# Patient Record
Sex: MALE | Race: BLACK OR AFRICAN AMERICAN | NOT HISPANIC OR LATINO | URBAN - METROPOLITAN AREA
[De-identification: names, ages, dates, MRNs, and addresses within clinical notes are randomized per-mention and may not be internally consistent; named-entity substitution may affect disease eponyms.]

---

## 2017-01-23 ENCOUNTER — INPATIENT (INPATIENT)
Facility: HOSPITAL | Age: 32
LOS: 0 days | Discharge: HOME | End: 2017-01-24
Attending: INTERNAL MEDICINE | Admitting: INTERNAL MEDICINE
Payer: COMMERCIAL

## 2017-01-23 VITALS
TEMPERATURE: 99 F | HEIGHT: 71 IN | WEIGHT: 235.01 LBS | OXYGEN SATURATION: 97 % | HEART RATE: 83 BPM | RESPIRATION RATE: 17 BRPM | DIASTOLIC BLOOD PRESSURE: 96 MMHG | SYSTOLIC BLOOD PRESSURE: 131 MMHG

## 2017-01-23 LAB
ALBUMIN SERPL ELPH-MCNC: 3.7 G/DL — SIGNIFICANT CHANGE UP (ref 3.3–5)
ALP SERPL-CCNC: 69 U/L — SIGNIFICANT CHANGE UP (ref 40–120)
ALT FLD-CCNC: 28 U/L — SIGNIFICANT CHANGE UP (ref 12–78)
ANION GAP SERPL CALC-SCNC: 9 MMOL/L — SIGNIFICANT CHANGE UP (ref 5–17)
APTT BLD: 29.9 SEC — SIGNIFICANT CHANGE UP (ref 27.5–37.4)
AST SERPL-CCNC: 23 U/L — SIGNIFICANT CHANGE UP (ref 15–37)
BASOPHILS # BLD AUTO: 0.1 K/UL — SIGNIFICANT CHANGE UP (ref 0–0.2)
BASOPHILS NFR BLD AUTO: 1.2 % — SIGNIFICANT CHANGE UP (ref 0–2)
BILIRUB SERPL-MCNC: 0.2 MG/DL — SIGNIFICANT CHANGE UP (ref 0.2–1.2)
BUN SERPL-MCNC: 20 MG/DL — SIGNIFICANT CHANGE UP (ref 7–23)
CALCIUM SERPL-MCNC: 8.7 MG/DL — SIGNIFICANT CHANGE UP (ref 8.5–10.1)
CHLORIDE SERPL-SCNC: 107 MMOL/L — SIGNIFICANT CHANGE UP (ref 96–108)
CO2 SERPL-SCNC: 28 MMOL/L — SIGNIFICANT CHANGE UP (ref 22–31)
CREAT SERPL-MCNC: 1.14 MG/DL — SIGNIFICANT CHANGE UP (ref 0.5–1.3)
EOSINOPHIL # BLD AUTO: 0.2 K/UL — SIGNIFICANT CHANGE UP (ref 0–0.5)
EOSINOPHIL NFR BLD AUTO: 2.2 % — SIGNIFICANT CHANGE UP (ref 0–6)
ERYTHROCYTE [SEDIMENTATION RATE] IN BLOOD: 25 MM/HR — HIGH (ref 0–15)
GLUCOSE SERPL-MCNC: 94 MG/DL — SIGNIFICANT CHANGE UP (ref 70–99)
HCT VFR BLD CALC: 39.2 % — SIGNIFICANT CHANGE UP (ref 39–50)
HGB BLD-MCNC: 14.1 G/DL — SIGNIFICANT CHANGE UP (ref 13–17)
INR BLD: 1.1 RATIO — SIGNIFICANT CHANGE UP (ref 0.88–1.16)
LACTATE SERPL-SCNC: 0.7 MMOL/L — SIGNIFICANT CHANGE UP (ref 0.7–2)
LYMPHOCYTES # BLD AUTO: 2.8 K/UL — SIGNIFICANT CHANGE UP (ref 1–3.3)
LYMPHOCYTES # BLD AUTO: 33 % — SIGNIFICANT CHANGE UP (ref 13–44)
MCHC RBC-ENTMCNC: 32.9 PG — SIGNIFICANT CHANGE UP (ref 27–34)
MCHC RBC-ENTMCNC: 36 GM/DL — SIGNIFICANT CHANGE UP (ref 32–36)
MCV RBC AUTO: 91.6 FL — SIGNIFICANT CHANGE UP (ref 80–100)
MONOCYTES # BLD AUTO: 0.8 K/UL — SIGNIFICANT CHANGE UP (ref 0–0.9)
MONOCYTES NFR BLD AUTO: 9.2 % — SIGNIFICANT CHANGE UP (ref 2–14)
NEUTROPHILS # BLD AUTO: 4.5 K/UL — SIGNIFICANT CHANGE UP (ref 1.8–7.4)
NEUTROPHILS NFR BLD AUTO: 54.4 % — SIGNIFICANT CHANGE UP (ref 43–77)
PLATELET # BLD AUTO: 258 K/UL — SIGNIFICANT CHANGE UP (ref 150–400)
POTASSIUM SERPL-MCNC: 4 MMOL/L — SIGNIFICANT CHANGE UP (ref 3.5–5.3)
POTASSIUM SERPL-SCNC: 4 MMOL/L — SIGNIFICANT CHANGE UP (ref 3.5–5.3)
PROT SERPL-MCNC: 7.7 GM/DL — SIGNIFICANT CHANGE UP (ref 6–8.3)
PROTHROM AB SERPL-ACNC: 12.3 SEC — SIGNIFICANT CHANGE UP (ref 10–13.1)
RBC # BLD: 4.28 M/UL — SIGNIFICANT CHANGE UP (ref 4.2–5.8)
RBC # FLD: 11.1 % — SIGNIFICANT CHANGE UP (ref 11–15)
SODIUM SERPL-SCNC: 144 MMOL/L — SIGNIFICANT CHANGE UP (ref 135–145)
WBC # BLD: 8.4 K/UL — SIGNIFICANT CHANGE UP (ref 3.8–10.5)
WBC # FLD AUTO: 8.4 K/UL — SIGNIFICANT CHANGE UP (ref 3.8–10.5)

## 2017-01-23 PROCEDURE — 73130 X-RAY EXAM OF HAND: CPT | Mod: 26,RT

## 2017-01-23 PROCEDURE — 99285 EMERGENCY DEPT VISIT HI MDM: CPT

## 2017-01-23 RX ORDER — PIPERACILLIN AND TAZOBACTAM 4; .5 G/20ML; G/20ML
3.38 INJECTION, POWDER, LYOPHILIZED, FOR SOLUTION INTRAVENOUS ONCE
Qty: 0 | Refills: 0 | Status: COMPLETED | OUTPATIENT
Start: 2017-01-23 | End: 2017-01-23

## 2017-01-23 RX ORDER — SODIUM CHLORIDE 9 MG/ML
1000 INJECTION INTRAMUSCULAR; INTRAVENOUS; SUBCUTANEOUS ONCE
Qty: 0 | Refills: 0 | Status: COMPLETED | OUTPATIENT
Start: 2017-01-23 | End: 2017-01-23

## 2017-01-23 RX ORDER — VANCOMYCIN HCL 1 G
1000 VIAL (EA) INTRAVENOUS ONCE
Qty: 0 | Refills: 0 | Status: COMPLETED | OUTPATIENT
Start: 2017-01-23 | End: 2017-01-23

## 2017-01-23 RX ORDER — KETOROLAC TROMETHAMINE 30 MG/ML
30 SYRINGE (ML) INJECTION ONCE
Qty: 0 | Refills: 0 | Status: DISCONTINUED | OUTPATIENT
Start: 2017-01-23 | End: 2017-01-23

## 2017-01-23 RX ADMIN — PIPERACILLIN AND TAZOBACTAM 200 GRAM(S): 4; .5 INJECTION, POWDER, LYOPHILIZED, FOR SOLUTION INTRAVENOUS at 20:21

## 2017-01-23 RX ADMIN — Medication 30 MILLIGRAM(S): at 20:21

## 2017-01-23 RX ADMIN — Medication 250 MILLIGRAM(S): at 22:02

## 2017-01-23 RX ADMIN — SODIUM CHLORIDE 1000 MILLILITER(S): 9 INJECTION INTRAMUSCULAR; INTRAVENOUS; SUBCUTANEOUS at 20:21

## 2017-01-23 NOTE — ED PROVIDER NOTE - CONSTITUTIONAL, MLM
normal... Well appearing, well nourished, awake, alert, oriented to person, place, time/situation and in no apparent distress. Speaking in clear full sentences no nasal flaring no shoulders retractions appears very comfortable sitting up at the edge of the stretcher

## 2017-01-23 NOTE — ED PROVIDER NOTE - OBJECTIVE STATEMENT
31 years old male walked in c/o swelling pain 7/10 to right 4th digit for one week. Pt sts he initially had a splinter and he removed it with a needle himself then it became swelling. Pt then went to [facility] treated by trying the drain by needle but not thing came out. and started on keflex orally 4 times a day but the swelling and pain increases over the weekend and swelling spread to his entire right hand. Pt denies headache, dizziness, focal/distal weakness or numbness, cough, sob, chest pain, nausea, vomiting, fever, chills, abd pain, pus drainage, or bleeding.

## 2017-01-23 NOTE — ED ADULT TRIAGE NOTE - CHIEF COMPLAINT QUOTE
" I have a splinter in my right ring finger and I opened it up and it swelled up, I went to [facility] they gave me a shot and antibiotics but it has been getting worse and now my whole right hand is swollen"

## 2017-01-23 NOTE — ED PROVIDER NOTE - PROGRESS NOTE DETAILS
Pt endorsed by Dr. Treadwell, present for eval of swelling & erythema of finger after removing splinter.  Pt was on Keflex, failed outpt tx of abx.  Pending remaining labs, imaging & plan to admit. Pt VSS in NAD, receiving Vancomycin.  Pt still deciding if he is agreeable to admission. pt agreeable to admission, previously discussed w Dr. Petty.  pt admitted for further eval/mgmt.

## 2017-01-23 NOTE — ED ADULT NURSE NOTE - CHIEF COMPLAINT QUOTE
" I have a splinter in my right ring finger and I opened it up and it swelled up, I went to [place] they gave me a shot and antibiotics but it has been getting worse and now my whole right hand is swollen"

## 2017-01-23 NOTE — ED PROVIDER NOTE - PHYSICAL EXAMINATION
+ swelling of 4th digit of right hand involving interphalangeal joints + swelling of right hand compare to left hand, good <2 seconds cap refills. able to flex and extend all joints of right 4th digits actively. tender to palp the right 4th digits but not right hand

## 2017-01-24 VITALS
HEART RATE: 84 BPM | DIASTOLIC BLOOD PRESSURE: 80 MMHG | OXYGEN SATURATION: 97 % | TEMPERATURE: 99 F | SYSTOLIC BLOOD PRESSURE: 122 MMHG | RESPIRATION RATE: 16 BRPM

## 2017-01-24 DIAGNOSIS — L02.511 CUTANEOUS ABSCESS OF RIGHT HAND: ICD-10-CM

## 2017-01-24 DIAGNOSIS — L03.011 CELLULITIS OF RIGHT FINGER: ICD-10-CM

## 2017-01-24 LAB
ANION GAP SERPL CALC-SCNC: 8 MMOL/L — SIGNIFICANT CHANGE UP (ref 5–17)
APPEARANCE UR: CLEAR — SIGNIFICANT CHANGE UP
BACTERIA # UR AUTO: ABNORMAL
BASOPHILS # BLD AUTO: 0.1 K/UL — SIGNIFICANT CHANGE UP (ref 0–0.2)
BASOPHILS NFR BLD AUTO: 1 % — SIGNIFICANT CHANGE UP (ref 0–2)
BILIRUB UR-MCNC: NEGATIVE — SIGNIFICANT CHANGE UP
BUN SERPL-MCNC: 20 MG/DL — SIGNIFICANT CHANGE UP (ref 7–23)
CALCIUM SERPL-MCNC: 8.5 MG/DL — SIGNIFICANT CHANGE UP (ref 8.5–10.1)
CHLORIDE SERPL-SCNC: 109 MMOL/L — HIGH (ref 96–108)
CO2 SERPL-SCNC: 27 MMOL/L — SIGNIFICANT CHANGE UP (ref 22–31)
COLOR SPEC: YELLOW — SIGNIFICANT CHANGE UP
COMMENT - URINE: SIGNIFICANT CHANGE UP
CREAT SERPL-MCNC: 1.08 MG/DL — SIGNIFICANT CHANGE UP (ref 0.5–1.3)
CRP SERPL-MCNC: 1.96 MG/DL — HIGH (ref 0–0.4)
CRP SERPL-MCNC: 2.59 MG/DL — HIGH (ref 0–0.4)
DIFF PNL FLD: NEGATIVE — SIGNIFICANT CHANGE UP
EOSINOPHIL # BLD AUTO: 0.1 K/UL — SIGNIFICANT CHANGE UP (ref 0–0.5)
EOSINOPHIL NFR BLD AUTO: 1.8 % — SIGNIFICANT CHANGE UP (ref 0–6)
EPI CELLS # UR: SIGNIFICANT CHANGE UP
GLUCOSE SERPL-MCNC: 88 MG/DL — SIGNIFICANT CHANGE UP (ref 70–99)
GLUCOSE UR QL: NEGATIVE MG/DL — SIGNIFICANT CHANGE UP
HCT VFR BLD CALC: 37.3 % — LOW (ref 39–50)
HGB BLD-MCNC: 13.1 G/DL — SIGNIFICANT CHANGE UP (ref 13–17)
KETONES UR-MCNC: ABNORMAL
LEUKOCYTE ESTERASE UR-ACNC: ABNORMAL
LYMPHOCYTES # BLD AUTO: 2.7 K/UL — SIGNIFICANT CHANGE UP (ref 1–3.3)
LYMPHOCYTES # BLD AUTO: 37.4 % — SIGNIFICANT CHANGE UP (ref 13–44)
MCHC RBC-ENTMCNC: 33.1 PG — SIGNIFICANT CHANGE UP (ref 27–34)
MCHC RBC-ENTMCNC: 35 GM/DL — SIGNIFICANT CHANGE UP (ref 32–36)
MCV RBC AUTO: 94.6 FL — SIGNIFICANT CHANGE UP (ref 80–100)
MONOCYTES # BLD AUTO: 0.6 K/UL — SIGNIFICANT CHANGE UP (ref 0–0.9)
MONOCYTES NFR BLD AUTO: 8.6 % — SIGNIFICANT CHANGE UP (ref 2–14)
NEUTROPHILS # BLD AUTO: 3.7 K/UL — SIGNIFICANT CHANGE UP (ref 1.8–7.4)
NEUTROPHILS NFR BLD AUTO: 51.2 % — SIGNIFICANT CHANGE UP (ref 43–77)
NITRITE UR-MCNC: NEGATIVE — SIGNIFICANT CHANGE UP
PH UR: 5 — SIGNIFICANT CHANGE UP (ref 4.8–8)
PLATELET # BLD AUTO: 224 K/UL — SIGNIFICANT CHANGE UP (ref 150–400)
POTASSIUM SERPL-MCNC: 4 MMOL/L — SIGNIFICANT CHANGE UP (ref 3.5–5.3)
POTASSIUM SERPL-SCNC: 4 MMOL/L — SIGNIFICANT CHANGE UP (ref 3.5–5.3)
PROT UR-MCNC: 30 MG/DL
RBC # BLD: 3.95 M/UL — LOW (ref 4.2–5.8)
RBC # FLD: 11.6 % — SIGNIFICANT CHANGE UP (ref 11–15)
RBC CASTS # UR COMP ASSIST: SIGNIFICANT CHANGE UP /HPF (ref 0–4)
SODIUM SERPL-SCNC: 144 MMOL/L — SIGNIFICANT CHANGE UP (ref 135–145)
SP GR SPEC: 1.02 — SIGNIFICANT CHANGE UP (ref 1.01–1.02)
UROBILINOGEN FLD QL: NEGATIVE MG/DL — SIGNIFICANT CHANGE UP
WBC # BLD: 7.1 K/UL — SIGNIFICANT CHANGE UP (ref 3.8–10.5)
WBC # FLD AUTO: 7.1 K/UL — SIGNIFICANT CHANGE UP (ref 3.8–10.5)
WBC UR QL: SIGNIFICANT CHANGE UP

## 2017-01-24 PROCEDURE — 99252 IP/OBS CONSLTJ NEW/EST SF 35: CPT

## 2017-01-24 PROCEDURE — 99223 1ST HOSP IP/OBS HIGH 75: CPT

## 2017-01-24 RX ORDER — CEPHALEXIN 500 MG
500 CAPSULE ORAL
Qty: 0 | Refills: 0 | Status: DISCONTINUED | OUTPATIENT
Start: 2017-01-24 | End: 2017-01-24

## 2017-01-24 RX ORDER — NICOTINE POLACRILEX 2 MG
1 GUM BUCCAL
Qty: 30 | Refills: 0 | OUTPATIENT
Start: 2017-01-24 | End: 2017-02-23

## 2017-01-24 RX ORDER — PIPERACILLIN AND TAZOBACTAM 4; .5 G/20ML; G/20ML
3.38 INJECTION, POWDER, LYOPHILIZED, FOR SOLUTION INTRAVENOUS EVERY 8 HOURS
Qty: 0 | Refills: 0 | Status: DISCONTINUED | OUTPATIENT
Start: 2017-01-24 | End: 2017-01-24

## 2017-01-24 RX ORDER — SODIUM CHLORIDE 9 MG/ML
1000 INJECTION INTRAMUSCULAR; INTRAVENOUS; SUBCUTANEOUS
Qty: 0 | Refills: 0 | Status: DISCONTINUED | OUTPATIENT
Start: 2017-01-24 | End: 2017-01-24

## 2017-01-24 RX ORDER — NICOTINE POLACRILEX 2 MG
1 GUM BUCCAL DAILY
Qty: 0 | Refills: 0 | Status: DISCONTINUED | OUTPATIENT
Start: 2017-01-24 | End: 2017-01-24

## 2017-01-24 RX ORDER — OXYCODONE HYDROCHLORIDE 5 MG/1
1 TABLET ORAL
Qty: 30 | Refills: 0 | OUTPATIENT
Start: 2017-01-24 | End: 2017-01-29

## 2017-01-24 RX ORDER — CEPHALEXIN 500 MG
1 CAPSULE ORAL
Qty: 28 | Refills: 0 | OUTPATIENT
Start: 2017-01-24 | End: 2017-01-31

## 2017-01-24 RX ADMIN — Medication 1 PATCH: at 13:00

## 2017-01-24 RX ADMIN — SODIUM CHLORIDE 80 MILLILITER(S): 9 INJECTION INTRAMUSCULAR; INTRAVENOUS; SUBCUTANEOUS at 03:21

## 2017-01-24 RX ADMIN — PIPERACILLIN AND TAZOBACTAM 25 GRAM(S): 4; .5 INJECTION, POWDER, LYOPHILIZED, FOR SOLUTION INTRAVENOUS at 06:10

## 2017-01-24 NOTE — CONSULT NOTE ADULT - SUBJECTIVE AND OBJECTIVE BOX
see dictated note.  tolerated I&D right ringer finger.  consider D/C home soon on po abx with close f/u in my office next wk.
Infectious Diseases - Attending at Dr. John    HPI:  31 year old male  with no PMH  c/o swelling, ( with (7/10) pain of right 4th digit) with spread to whole hand over one week. Pt says he initially had a splinter and he removed it with a needle himself, but despite this the finger began swelling. Pt then went to [facility] where they tried to aspirate the finger without success, he was given  an injection and was started on keflex orally 4 times a day, which he says he took as ordered. The swelling and pain increased over the weekend and swelling spread to his entire right hand. Pt denies headache, dizziness, focal/distal weakness or numbness, cough, sob, chest pain, nausea, vomiting, fever, chills, abd pain, pus drainage, or bleeding. (2017 01:48)    Pt had the abscess drained by Dr Meléndez today.C/O extreme pain in right hand,no fever      PAST MEDICAL & SURGICAL HISTORY:  No pertinent past medical history  No significant past surgical history      Allergies    No Known Allergies    Intolerances        FAMILY HISTORY:  No pertinent family history in first degree relatives      SOCIAL HISTORY:smoker    REVIEW OF SYSTEMS:    Constitutional: No fever, weight loss or fatigue  Eyes: No eye pain, visual disturbances, or discharge  ENT:  No difficulty hearing, tinnitus, vertigo; No sinus or throat pain  Neck: No pain or stiffness  Respiratory: No cough, wheezing, chills or hemoptysis  Cardiovascular: No chest pain, palpitations, shortness of breath, dizziness or leg swelling  Gastrointestinal: No abdominal or epigastric pain. No nausea, vomiting or hematemesis; No diarrhea or constipation. No melena or hematochezia.  Genitourinary: No dysuria, frequency, hematuria or incontinence  Rectal: No pain, hemorrhoids or incontinence  Neurological: No headaches, memory loss, loss of strength, numbness or tremors  Skin: No itching, burning, rashes or lesions   Lymph Nodes: No enlarged glands  Endocrine: No heat or cold intolerance; No hair loss  Musculoskeletal: Rt hand pain and swelling      MEDICATIONS  (STANDING):  sodium chloride 0.9%. 1000milliLiter(s) IV Continuous <Continuous>  nicotine -   7 mG/24Hr(s) Patch 1patch Transdermal daily  doxycycline hyclate Capsule 100milliGRAM(s) Oral every 12 hours  cephalexin 500milliGRAM(s) Oral four times a day    MEDICATIONS  (PRN):  oxyCODONE  5 mG/acetaminophen 325 mG 1Tablet(s) Oral every 4 hours PRN Moderate Pain (4 - 6)      Vital Signs Last 24 Hrs  T(C): 37.1, Max: 37.2 ( @ 00:41)  T(F): 98.7, Max: 98.9 ( @ 00:41)  HR: 84 (68 - 84)  BP: 122/80 (122/80 - 142/100)  BP(mean): --  RR: 16 (14 - 18)  SpO2: 97% (97% - 100%)    PHYSICAL EXAM:    Constitutional: NAD, well-groomed, well-developed  HEENT: PERRLA, EOMI, Normal Hearing, MMM  Neck: No LAD, No JVD  Back: Normal spine flexure, No CVA tenderness  Respiratory: CTAB/L   Cardiovascular: S1 and S2, RRR, no M/G/R  Gastrointestinal: BS+, soft, NT/ND  Extremities: No peripheral edema  Vascular: 2+ peripheral pulses  Neurological: A/O x 3, no focal deficits  Skin: No rashes      LABS:                        13.1   7.1   )-----------( 224      ( 2017 08:31 )             37.3     2017 08:31    144    |  109    |  20     ----------------------------<  88     4.0     |  27     |  1.08     Ca    8.5        2017 08:31    TPro  7.7    /  Alb  3.7    /  TBili  0.2    /  DBili  x      /  AST  23     /  ALT  28     /  AlkPhos  69     2017 18:53    PT/INR - ( 2017 18:53 )   PT: 12.3 sec;   INR: 1.10 ratio         PTT - ( 2017 18:53 )  PTT:29.9 sec  Urinalysis Basic - ( 2017 04:01 )    Color: Yellow / Appearance: Clear / S.025 / pH: x  Gluc: x / Ketone: Trace  / Bili: Negative / Urobili: Negative mg/dL   Blood: x / Protein: 30 mg/dL / Nitrite: Negative   Leuk Esterase: Trace / RBC: 0-2 /HPF / WBC 0-2   Sq Epi: x / Non Sq Epi: Few / Bacteria: Few        MICROBIOLOGY:  RECENT CULTURES:        RADIOLOGY & ADDITIONAL STUDIES:

## 2017-01-24 NOTE — H&P ADULT. - NEGATIVE NEUROLOGICAL SYMPTOMS
no headache/no loss of consciousness/no syncope/no transient paralysis/no confusion/no vertigo/no generalized seizures/no paresthesias

## 2017-01-24 NOTE — H&P ADULT. - HISTORY OF PRESENT ILLNESS
31 year old male  with no PMH  c/o swelling, ( with (7/10) pain of right 4th digit) with spread to whole hand over one week. Pt says he initially had a splinter and he removed it with a needle himself, but despite this the finger began swelling. Pt then went to [facility] where they tried to aspirate the finger without success, he was given  an injection and was started on keflex orally 4 times a day, which he says he took as ordered. The swelling and pain increased over the weekend and swelling spread to his entire right hand. Pt denies headache, dizziness, focal/distal weakness or numbness, cough, sob, chest pain, nausea, vomiting, fever, chills, abd pain, pus drainage, or bleeding.

## 2017-01-24 NOTE — DISCHARGE NOTE ADULT - CARE PLAN
Principal Discharge DX:	Cellulitis of finger of right hand  Goal:	to infection  Instructions for follow-up, activity and diet:	Follow up with dr paula and dr kendrick

## 2017-01-24 NOTE — DISCHARGE NOTE ADULT - PATIENT PORTAL LINK FT
“You can access the Follow[facility] Patient Portal, offered by [facility], by registering with the following website: [email or web address]”

## 2017-01-24 NOTE — DISCHARGE NOTE ADULT - CARE PROVIDER_API CALL
Cachorro Petty), Plastic Surgery  [address]  [place], [place] [id number]  Phone: [phone]  Fax: [phone]    Mari Beckwith (FLY), Infectious Disease; Internal Medicine  [address] [place] [id number]  Phone: [phone]  Fax: [phone]

## 2017-01-24 NOTE — CONSULT NOTE ADULT - PROBLEM SELECTOR RECOMMENDATION 9
s/P I and D  Switch to kelfex and doxy X 10 days  F/U with me next week  F/U on c/s done today  pain control  Wound care  Clear for d/c home

## 2017-01-24 NOTE — H&P ADULT. - RADIOLOGY RESULTS AND INTERPRETATION
xray hand: Dorsal hand soft tissue swelling without acute displaced fracture or   dislocation, no air or FB.

## 2017-01-24 NOTE — DISCHARGE NOTE ADULT - MEDICATION SUMMARY - MEDICATIONS TO TAKE
I will START or STAY ON the medications listed below when I get home from the hospital:    acetaminophen-oxyCODONE 325 mg-5 mg oral tablet  -- 1 tab(s) by mouth every 4 hours, As needed, Moderate Pain (4 - 6) MDD:6  -- Indication: For Cellulitis of finger of right hand    doxycycline monohydrate 100 mg oral capsule  -- 1 cap(s) by mouth every 12 hours  -- Indication: For Cellulitis of finger of right hand    cephalexin 500 mg oral capsule  -- 1 cap(s) by mouth 4 times a day  -- Indication: For Cellulitis of finger of right hand    Habitrol 21 mg/24 hr transdermal film, extended release  -- 1 each by transdermal patch once a day  -- Do not take this drug if you are pregnant.  For external use only.  It is very important that you take or use this exactly as directed.  Do not skip doses or discontinue unless directed by your doctor.  Remove old patch prior to applying a new patch.    -- Indication: For Cellulitis of finger of right hand

## 2017-01-24 NOTE — H&P ADULT. - RS GEN PE MLT RESP DETAILS PC
breath sounds equal/clear to auscultation bilaterally/no wheezes/respirations non-labored/good air movement/airway patent/no rales/no rhonchi

## 2017-01-24 NOTE — DISCHARGE NOTE ADULT - HOSPITAL COURSE
31 year old male  with no PMH  c/o swelling, ( with (7/10) pain of right 4th digit) with spread to whole hand over one week. Pt says he initially had a splinter and he removed it with a needle himself, but despite this the finger began swelling. Pt then went to [facility] where they tried to aspirate the finger without success, he was given  an injection and was started on keflex orally 4 times a day, which he says he took as ordered. The swelling and pain increased over the weekend and swelling spread to his entire right hand. Pt denies headache, dizziness, focal/distal weakness or numbness, cough, sob, chest pain, nausea, vomiting, fever, chills, abd pain, pus drainage, or bleeding.  patient had an incision and drainage by dr paula and was cleared to be discharged by him and by infectious disease on oral antibiotics

## 2017-01-25 LAB
CULTURE RESULTS: NO GROWTH — SIGNIFICANT CHANGE UP
SPECIMEN SOURCE: SIGNIFICANT CHANGE UP

## 2017-01-26 LAB
-  AMPICILLIN/SULBACTAM: SIGNIFICANT CHANGE UP
-  CEFAZOLIN: SIGNIFICANT CHANGE UP
-  CIPROFLOXACIN: SIGNIFICANT CHANGE UP
-  CLINDAMYCIN: SIGNIFICANT CHANGE UP
-  ERYTHROMYCIN: SIGNIFICANT CHANGE UP
-  GENTAMICIN: SIGNIFICANT CHANGE UP
-  LEVOFLOXACIN: SIGNIFICANT CHANGE UP
-  MOXIFLOXACIN(AEROBIC): SIGNIFICANT CHANGE UP
-  OXACILLIN: SIGNIFICANT CHANGE UP
-  RIFAMPIN: SIGNIFICANT CHANGE UP
-  TETRACYCLINE: SIGNIFICANT CHANGE UP
-  TRIMETHOPRIM/SULFAMETHOXAZOLE: SIGNIFICANT CHANGE UP
-  VANCOMYCIN: SIGNIFICANT CHANGE UP
METHOD TYPE: SIGNIFICANT CHANGE UP

## 2017-01-28 LAB
CULTURE RESULTS: SIGNIFICANT CHANGE UP
ORGANISM # SPEC MICROSCOPIC CNT: SIGNIFICANT CHANGE UP
ORGANISM # SPEC MICROSCOPIC CNT: SIGNIFICANT CHANGE UP
SPECIMEN SOURCE: SIGNIFICANT CHANGE UP

## 2017-01-29 LAB
CULTURE RESULTS: SIGNIFICANT CHANGE UP
CULTURE RESULTS: SIGNIFICANT CHANGE UP
SPECIMEN SOURCE: SIGNIFICANT CHANGE UP
SPECIMEN SOURCE: SIGNIFICANT CHANGE UP

## 2017-01-30 DIAGNOSIS — L03.011 CELLULITIS OF RIGHT FINGER: ICD-10-CM

## 2017-01-30 DIAGNOSIS — F17.210 NICOTINE DEPENDENCE, CIGARETTES, UNCOMPLICATED: ICD-10-CM

## 2017-01-30 DIAGNOSIS — L02.511 CUTANEOUS ABSCESS OF RIGHT HAND: ICD-10-CM

## 2017-01-30 DIAGNOSIS — M65.041: ICD-10-CM

## 2023-03-10 NOTE — H&P ADULT. - RESPIRATORY AND THORAX
Procedure Information: Please note that the numeric value listed in the Medication (1) and associated J-code units and Medication (2) and associated J-code units variables are j-code amounts and do not represent either the concentration or the total amount of the medications injected.  I strongly recommend selecting no to the Render J-code information in note question. This will allow your note to be more clear. If you are billing j-codes with your injection codes you need to document the total amount of the medication injected. This amount should match the j-code units. For example, if you are injecting Triamcinolone 40mg as an intramuscular injection you would select 40 for the dose field and mg for the units. This would allow you to document  with 4 units (40mg = 10mg x 4). The total volume is not used to calculate j-codes only the amount of the medication administered. details…

## 2023-12-28 NOTE — ED ADULT TRIAGE NOTE - SPO2 (%)
Caller: Mia (pts mother)    Doctor: Dr Rangel    Reason for call: Pt is currently in the ED because of the pain in his back    Call back#: [phone]      97

## 2023-12-28 NOTE — DISCHARGE NOTE ADULT - LAUNCH MEDICATION RECONCILIATION
[de-identified] : Right Knee Exam:   Skin: Clean, dry, intact Inspection: No obvious malalignment, no masses, no swelling, no effusion Pulses: 2+ DP/PT pulses ROM:  degrees of flexion. + pain with deep knee flexion/extension. Tenderness: + MJLT. + LJLT. No pain over the patella facets. No pain to the quadriceps tendon. No pain to the patella tendon. No posterior knee tenderness. Stability: Stable to varus, valgus. Negative Lachman testing. Negative anterior drawer, negative posterior drawer. Strength: 5/5 Q/H/TA/GS/EHL, without atrophy Neuro: Intact to light touch throughout, DTRs normal Additional Tests: Negative Alex's test, Negative patellar grind test Other findings: None. [de-identified] : The following radiographs were ordered and read by me during this patients visit. I reviewed each radiograph in detail with the patient and discussed the findings as highlighted below.   4 views of the right knee were obtained today, 12/14/2023, that show no acute fracture or dislocation. There is severe medial, moderate lateral and severe patellofemoral degenerative changes seen. There is no significant malalignment. No significant other obvious osseous abnormality, otherwise unremarkable.  <<-----Click here for Discharge Medication Review

## 2024-04-03 NOTE — H&P ADULT. - GENERAL
HCC coding opportunities       Chart reviewed, no opportunity found: CHART REVIEWED, NO OPPORTUNITY FOUND        Patients Insurance     Medicare Insurance: [employer] Advantage          
details…

## 2024-10-25 NOTE — ED ADULT TRIAGE NOTE - AS TEMP SITE
Speech Therapy      Visit Type: Treatment  -  Communication/cognition  Reason for consult: Speech therapy ordered upon admit to the inpatient rehab unit.     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    SUBJECTIVE  Patient with a nosebleed throughout. Dr Harp saw patient during the session and she was made aware.     Pain at onset of session:   Patient does not demonstrate pain behaviors.      OBJECTIVE         Communication/Cognition  Reading Comprehension:      - Functional reading: moderate impairment (50-74% accuracy) (multicomponent single step directives: 40% independently. Moderate cues to increase accuracy.)  Numerical Reasoning:      - Money management - checkbook skills: severe impairment (25-49% accuracy) (He was notably confused about how the process for managing a checkbook would occur.)    - He required moderate cues to identify how to complete the task which should have been somewhat intuitive given the check information, deposits, and fees.  He incorrectly put check and deposit amounts on the same line, only added deposits to the total without subtracting checks. Given explicit instructions he continued to require cues and asking for clarification, \"you want me to write in the deposits on each line?\"      Patient maintains that he writes at least 7 checks monthly (credit card, utilizies, insurance, etc) and other occasional instances (car registration, etc). He was not seemingly well familiar with a check register this date if he uses one as he states.              ASSESSMENT  Impairments: verbal expression and problem solving/executive function  Functional Limitations: communication/cognition   Treatment targeted functional check writing and balancing a checkbook as he reports these are tasks he does at home. Following multicomponent directives with moderate to maximum cues to comprehend question. Decreased attention to  detail impacting accuracy.     Discharge Recommendations  Recommendations for Discharge: SLP IL: Patient is appropriate for Speech Therapy 1-3 times per week              Rehab Potential: good      Potential barriers to progress:  Current medical conditions and lethargy    Therapy Participation: This patient participated in all scheduled speech therapy time this session.    Patient at end of session:    - location: in wheelchair    - safety measures: alarm system in place/re-engaged and call light within reach    - hand off to: nurse assistant (Hermann)    PLAN   SLP Frequency: JT 30-60x/day; 5-7x/wk  Duration: TBD         RECOMMENDATIONS     -Communication Cognition:          *Patient continues to demonstrate acute communication and cognition deficits, will continue to follow.  Patient will require periodic supervision at discharge and assist with instrumental activities of daily living.  Patient would benefit from intensive rehab program.        GOALS  Review date: 10/31/2024  Short Term Goals: to be met 7 days from date established, unless otherwise stated.  1. Patient will complete moderately complex problem solving tasks with 80% accuracy with minimal to no cues.     2. Patient will improve immediate/working memory for 5 words/items in 90% of tasks.    3. Patient will complete higher level naming tasks with specific parameters with 85% acc indepenently.    4. Patient will complete multicomponent written/auditory directives with 90% accuracy independently.   Long Term Goals: to be met by discharge from rehab program.  Patient will return home without increased burden of care for cognitive needs.     Patient will return to his prior cognitive level allowing for safe and appropriate ongoing employment as an .        Therapy procedure time and total treatment time can be found documented on the Time Entry flowsheet   oral

## 2024-10-29 NOTE — H&P ADULT. - ASSESSMENT
Caller: Chirag Mata    Relationship: Self    Best call back number: [phone]     Requested Prescriptions:   Requested Prescriptions     Pending Prescriptions Disp Refills    amitriptyline (ELAVIL) 75 MG tablet       Sig: Take 1 tablet by mouth Every Night.        Pharmacy where request should be sent: [facility] [address] [place] [address] [phone] [facility] [phone]      Last office visit with prescribing clinician: 9/12/2024   Last telemedicine visit with prescribing clinician: Visit date not found   Next office visit with prescribing clinician: 3/12/2025     Additional details provided by patient: PATIENT ONLY HAS 2 PILLS LEFT    Does the patient have less than a 3 day supply:  [x] Yes  [] No    Would you like a call back once the refill request has been completed: [x] Yes [] No    If the office needs to give you a call back, can they leave a voicemail: [] Yes [x] No    Juan Thapa Rep   10/29/24 08:19 CDT            
31 year old with no PMH with swollen right hand and 4th digit, failed outpatient antibiotics

## 2024-12-23 NOTE — H&P ADULT. - [facility] PE MLT D E PC
Continued Stay Note  [facility]     Patient Name: Brittani Lambert  MRN: [id number]  Today's Date: 12/23/2024    Admit Date: 12/16/2024    Plan: [facility], pending insurance approval   Discharge Plan       Row Name 12/23/24 1005       Plan    Plan [facility], pending insurance approval    Plan Comments Spoke wtih Callie at [facility] and insurance pre-cert is still pending for SNF.  Facility pharmacy is [facility] in [place].  Patient will need transportation arranged at d/c.  CM will continue to follow.                    Discharge Codes    No documentation.                       Margarita Jesus RN     no rub/no murmur/regular rate and rhythm